# Patient Record
Sex: FEMALE | Race: WHITE | NOT HISPANIC OR LATINO
[De-identification: names, ages, dates, MRNs, and addresses within clinical notes are randomized per-mention and may not be internally consistent; named-entity substitution may affect disease eponyms.]

---

## 2019-05-14 PROBLEM — Z00.00 ENCOUNTER FOR PREVENTIVE HEALTH EXAMINATION: Status: ACTIVE | Noted: 2019-05-14

## 2019-05-21 ENCOUNTER — APPOINTMENT (OUTPATIENT)
Dept: DERMATOLOGY | Facility: CLINIC | Age: 74
End: 2019-05-21
Payer: MEDICARE

## 2019-05-21 VITALS — WEIGHT: 118 LBS | HEIGHT: 63 IN | BODY MASS INDEX: 20.91 KG/M2

## 2019-05-21 DIAGNOSIS — Z78.9 OTHER SPECIFIED HEALTH STATUS: ICD-10-CM

## 2019-05-21 DIAGNOSIS — D23.72 OTHER BENIGN NEOPLASM OF SKIN OF LEFT LOWER LIMB, INCLUDING HIP: ICD-10-CM

## 2019-05-21 DIAGNOSIS — Z80.8 FAMILY HISTORY OF MALIGNANT NEOPLASM OF OTHER ORGANS OR SYSTEMS: ICD-10-CM

## 2019-05-21 DIAGNOSIS — Z80.9 FAMILY HISTORY OF MALIGNANT NEOPLASM, UNSPECIFIED: ICD-10-CM

## 2019-05-21 DIAGNOSIS — Z87.2 PERSONAL HISTORY OF DISEASES OF THE SKIN AND SUBCUTANEOUS TISSUE: ICD-10-CM

## 2019-05-21 PROCEDURE — 99213 OFFICE O/P EST LOW 20 MIN: CPT

## 2019-05-21 NOTE — PHYSICAL EXAM
[Alert] : alert [Oriented x 3] : ~L oriented x 3 [Well Nourished] : well nourished [FreeTextEntry3] : The following areas were examined and no significant abnormalities were seen except as noted below:\par \par Type II skin\par \par scalp, face, eyelids, nose, lips, ears, neck, chest, abdomen, back, buttocks, right arm, left arm, right hand, left hand,\par right  leg, left leg, right foot, left foot\par Breast and groin exams offered and declined by patient.\par \par Nose: No lesions seen\par Left mid lateral back: 4 x 3 mm dark brown macule\par Upper mid back left: 2 well-healed linear scars present\par Left upper mid back: 7 x 5 mm well-demarcated light brown patch, darker west-no suspicious on dermoscopy\par Left flank: 3 mm, black, verrucous papule\par Upper abdomen: 6 x 4 mm, brown mildly mottled patch-not suspicious on dermoscopy\par Left upper shin: 4 x 3 mm, firm, dull, erythematous papule\par \par No suspicious lesions seen

## 2019-05-21 NOTE — HISTORY OF PRESENT ILLNESS
[FreeTextEntry1] : Evaluation of growths [de-identified] : Followup visit for a 73-year-old white female presenting for evaluation of growths. Particularly concerned about a lesion on the nose.? Previous history of skin cancer.

## 2020-02-10 ENCOUNTER — APPOINTMENT (OUTPATIENT)
Dept: DERMATOLOGY | Facility: CLINIC | Age: 75
End: 2020-02-10
Payer: MEDICARE

## 2020-02-10 PROCEDURE — 17000 DESTRUCT PREMALG LESION: CPT

## 2020-02-10 PROCEDURE — 17003 DESTRUCT PREMALG LES 2-14: CPT

## 2020-02-10 NOTE — HISTORY OF PRESENT ILLNESS
[FreeTextEntry1] : Growth on nose [de-identified] : Followup visit for 74-year-old white female presenting with a new lesion on the nose.\par ? Previous history of skin cancer.\par Note-patient had a full skin exam done on May 21, 2019.

## 2020-02-10 NOTE — PHYSICAL EXAM
[Alert] : alert [Oriented x 3] : ~L oriented x 3 [Well Nourished] : well nourished [FreeTextEntry3] : Distal nose: 3 x 3 mm pink scaly macule\par Left mid nose: 2 x 2 mm pink scaly macule

## 2020-05-06 ENCOUNTER — APPOINTMENT (OUTPATIENT)
Dept: DERMATOLOGY | Facility: CLINIC | Age: 75
End: 2020-05-06

## 2021-01-04 ENCOUNTER — TRANSCRIPTION ENCOUNTER (OUTPATIENT)
Age: 76
End: 2021-01-04

## 2021-03-25 ENCOUNTER — APPOINTMENT (OUTPATIENT)
Dept: DERMATOLOGY | Facility: CLINIC | Age: 76
End: 2021-03-25

## 2021-04-07 ENCOUNTER — APPOINTMENT (OUTPATIENT)
Dept: DERMATOLOGY | Facility: CLINIC | Age: 76
End: 2021-04-07
Payer: MEDICARE

## 2021-04-07 ENCOUNTER — LABORATORY RESULT (OUTPATIENT)
Age: 76
End: 2021-04-07

## 2021-04-07 ENCOUNTER — TRANSCRIPTION ENCOUNTER (OUTPATIENT)
Age: 76
End: 2021-04-07

## 2021-04-07 DIAGNOSIS — K13.0 DISEASES OF LIPS: ICD-10-CM

## 2021-04-07 PROCEDURE — 99213 OFFICE O/P EST LOW 20 MIN: CPT | Mod: 25

## 2021-04-07 PROCEDURE — 11102 TANGNTL BX SKIN SINGLE LES: CPT

## 2021-04-07 NOTE — HISTORY OF PRESENT ILLNESS
[FreeTextEntry1] : Evaluation of growths [de-identified] : Followup visit for 75-year-old white female last seen by me in February 10, 2020, presenting for evaluation of growths.  \par Emmy Vijay previous history of skin cancer.  Patient has history of actinic keratosis treated with cryosurgery in the past.\par Patient complains of recent sores on the lower lip following a trip to [place].  Attributes this to usage of a new type of toothpaste which she has since discontinued

## 2021-04-07 NOTE — ASSESSMENT
[FreeTextEntry1] : Cheilitis lower lip of uncertain etiology - ? Residua of herpes labialis,? Reaction to sunlight,? Reaction to new toothpaste\par ? Melanocytic nevus, R/O malignant melanoma on back

## 2021-04-07 NOTE — PHYSICAL EXAM
[Alert] : alert [Oriented x 3] : ~L oriented x 3 [Well Nourished] : well nourished [FreeTextEntry3] : The following areas were examined and no significant abnormalities were seen except as noted below:\par \par Type II skin\par \par scalp, face, eyelids, nose, lips, ears, neck, chest, abdomen, back, buttocks, right arm, left arm, right hand, left hand,\par right  leg, left leg, right foot, left foot\par Breast and groin exams offered and declined by patient.\par \par Lower lip:  Diffuse mild purulence present, especially mid and right lip areas\par Left upper midback: 7 x 5 mm splotchy light brown patch - near a jagged scar from prior procedure\par Rare small black for his papules on trunk\par \par No other suspicious lesions

## 2021-04-16 ENCOUNTER — NON-APPOINTMENT (OUTPATIENT)
Age: 76
End: 2021-04-16

## 2021-05-12 ENCOUNTER — APPOINTMENT (OUTPATIENT)
Dept: DERMATOLOGY | Facility: CLINIC | Age: 76
End: 2021-05-12
Payer: MEDICARE

## 2021-05-12 PROCEDURE — 99214 OFFICE O/P EST MOD 30 MIN: CPT

## 2021-06-07 ENCOUNTER — APPOINTMENT (OUTPATIENT)
Dept: DERMATOLOGY | Facility: CLINIC | Age: 76
End: 2021-06-07
Payer: MEDICARE

## 2021-06-07 ENCOUNTER — RESULT REVIEW (OUTPATIENT)
Age: 76
End: 2021-06-07

## 2021-06-07 ENCOUNTER — APPOINTMENT (OUTPATIENT)
Dept: DERMATOLOGY | Facility: CLINIC | Age: 76
End: 2021-06-07

## 2021-06-07 DIAGNOSIS — D22.9 MELANOCYTIC NEVI, UNSPECIFIED: ICD-10-CM

## 2021-06-07 PROCEDURE — 11402 EXC TR-EXT B9+MARG 1.1-2 CM: CPT

## 2021-06-07 RX ORDER — MUPIROCIN 20 MG/G
2 OINTMENT TOPICAL TWICE DAILY
Qty: 1 | Refills: 1 | Status: ACTIVE | COMMUNITY
Start: 2021-06-07 | End: 1900-01-01

## 2021-06-09 PROBLEM — D22.9 ATYPICAL NEVUS: Status: ACTIVE | Noted: 2021-05-16

## 2022-05-09 ENCOUNTER — APPOINTMENT (OUTPATIENT)
Dept: DERMATOLOGY | Facility: CLINIC | Age: 77
End: 2022-05-09
Payer: MEDICARE

## 2022-05-09 DIAGNOSIS — Z86.018 PERSONAL HISTORY OF OTHER BENIGN NEOPLASM: ICD-10-CM

## 2022-05-09 PROCEDURE — 11102 TANGNTL BX SKIN SINGLE LES: CPT

## 2022-05-09 PROCEDURE — 99212 OFFICE O/P EST SF 10 MIN: CPT | Mod: 25

## 2022-05-09 NOTE — ASSESSMENT
[FreeTextEntry1] : Seborrheic keratoses and melanocytic nevi on trunk\par ? Lichen planus like keratosis, rule out SCC in situ on right mid forearm

## 2022-05-09 NOTE — PHYSICAL EXAM
[Alert] : alert [Oriented x 3] : ~L oriented x 3 [Well Nourished] : well nourished [FreeTextEntry3] : The following areas were examined and no significant abnormalities were seen except as noted below:\par \par Type II skin\par \par scalp, face, eyelids, nose, lips, ears, neck, chest, abdomen, back, buttocks, right arm, left arm, right hand, left hand,\par right  leg, left leg, right foot, left foot\par Breast and groin exams offered and declined by patient.\par \par Right mid forearm: 8 x 6 mm bright erythematous scaly plaque\par Left upper back: No evidence recurrence of the previously removed lesion\par Left mid lateral back: 5 x 3 mm dark brown macule-not suspicious on dermoscopy\par Trunk: Rare small brown verrucous plaques\par \par No other suspicious lesions seen

## 2022-05-09 NOTE — HISTORY OF PRESENT ILLNESS
[FreeTextEntry1] : Evaluation of growths [de-identified] : Followup visit for 76-year-old white female last seen by me on April 7, 2021, for evaluation of growths.  Particularly concerned about a lesion on the right forearm present for 6 months\par Patient has a history of a moderately dysplastic nevus left upper back which was excised with no residual tumor on May 12, 2021.  \par ? History of prior skin cancers.

## 2022-05-24 ENCOUNTER — NON-APPOINTMENT (OUTPATIENT)
Age: 77
End: 2022-05-24

## 2022-06-22 ENCOUNTER — APPOINTMENT (OUTPATIENT)
Dept: DERMATOLOGY | Facility: CLINIC | Age: 77
End: 2022-06-22
Payer: MEDICARE

## 2022-06-22 DIAGNOSIS — D04.61 CARCINOMA IN SITU OF SKIN OF RIGHT UPPER LIMB, INCLUDING SHOULDER: ICD-10-CM

## 2022-06-22 PROCEDURE — 17261 DSTRJ MAL LES T/A/L .6-1.0CM: CPT

## 2022-06-22 NOTE — PHYSICAL EXAM
[Alert] : alert [Oriented x 3] : ~L oriented x 3 [Well Nourished] : well nourished [FreeTextEntry3] : Patient wearing a facemask\par \par Right forearm: Wound healing well without evidence of infection

## 2022-06-22 NOTE — HISTORY OF PRESENT ILLNESS
[FreeTextEntry1] : Squamous cell carcinoma in situ [de-identified] : Followup visit for a 76-year-old white female last seen by me on May 9, 2022, for eradication via D. & C. 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

## 2022-12-07 ENCOUNTER — APPOINTMENT (OUTPATIENT)
Dept: DERMATOLOGY | Facility: CLINIC | Age: 77
End: 2022-12-07

## 2022-12-07 PROCEDURE — 99212 OFFICE O/P EST SF 10 MIN: CPT | Mod: 25

## 2022-12-07 PROCEDURE — 11102 TANGNTL BX SKIN SINGLE LES: CPT

## 2022-12-07 NOTE — HISTORY OF PRESENT ILLNESS
[FreeTextEntry1] : Evaluation of growths [de-identified] : Follow-up visit for 76-year-old white female last seen by me on May 9, 2022, for evaluation of growths.  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  History of prior skin cancers.\par \par Particularly concerned about a lesion on the right arm which has become tender.  Also concerned about a dry area on the left lateral neck

## 2022-12-07 NOTE — ASSESSMENT
[FreeTextEntry1] : ?  Actinic keratosis,?  Residual of lichen planus-like keratosis on left lower lateral neck\par ?  Melanocytic nevus, inflamed on right distal arm

## 2022-12-07 NOTE — PHYSICAL EXAM
[Alert] : alert [Oriented x 3] : ~L oriented x 3 [Well Nourished] : well nourished [FreeTextEntry3] : The following areas were examined and no significant abnormalities were seen except as noted below:\par \par Type II skin\par \par scalp, face, eyelids, nose, lips, ears, neck, chest, abdomen, back, buttocks, right arm, left arm, right hand, left hand,\par right  leg, left leg, right foot, left foot\par Breast and groin exams offered and declined by patient.\par \par Left lower lateral neck: 8 x 4 mm desquamating pink patch–not suspicious on dermoscopy\par Right distal arm: 6 x 4 mm protuberant brown smooth plaque\par Right forearm: Wound well-healed without evidence of recurrence\par Trunk: Mild small black verrucous papules\par \par No other suspicious lesions seen\par \par No other suspicious lesions seen

## 2022-12-14 ENCOUNTER — NON-APPOINTMENT (OUTPATIENT)
Age: 77
End: 2022-12-14

## 2023-03-28 ENCOUNTER — APPOINTMENT (OUTPATIENT)
Dept: DERMATOLOGY | Facility: CLINIC | Age: 78
End: 2023-03-28
Payer: MEDICARE

## 2023-03-28 DIAGNOSIS — R20.8 OTHER DISTURBANCES OF SKIN SENSATION: ICD-10-CM

## 2023-03-28 DIAGNOSIS — L91.0 HYPERTROPHIC SCAR: ICD-10-CM

## 2023-03-28 DIAGNOSIS — D48.5 NEOPLASM OF UNCERTAIN BEHAVIOR OF SKIN: ICD-10-CM

## 2023-03-28 PROCEDURE — 11900 INJECT SKIN LESIONS </W 7: CPT

## 2023-03-28 PROCEDURE — 99212 OFFICE O/P EST SF 10 MIN: CPT | Mod: 25

## 2023-03-28 NOTE — ASSESSMENT
[FreeTextEntry1] : ?  Lichen planus-like keratosis,?  Actinic keratosis of left lower lateral neck\par Probable incipient hypertrophic scar on left zygoma from prior dog bite

## 2023-03-28 NOTE — PHYSICAL EXAM
[Alert] : alert [Oriented x 3] : ~L oriented x 3 [Well Nourished] : well nourished [FreeTextEntry3] : Left zygoma: 7 x 5 mm faint pink firm smooth nodule\par Left lower lateral neck: 10 x 8 mm desquamating erythematous patch

## 2023-03-28 NOTE — HISTORY OF PRESENT ILLNESS
[FreeTextEntry1] : Growth on left neck [de-identified] : Follow-up visit for 77-year-old white female last seen by me in December 7, 2022 (at which time she had a full skin exam) for a reevaluation of a lesion on the left lateral neck.\par Lesion described as an 8 x 4 mm desquamating pink patch.  Diagnosed as a ?  Actinic keratosis, ?  Residua of lichen planus-like keratosis\par Patient has history of squamous cell carcinoma in situ as well as other?  Skin cancers treated in the past.\par Patient also has history of moderately dysplastic nevus on the left upper back which was excised with no residual tumor on May 12, 2021\par \par Patient also complains of a new lump below the left eye which occurred after a dog bite

## 2023-06-27 ENCOUNTER — APPOINTMENT (OUTPATIENT)
Dept: DERMATOLOGY | Facility: CLINIC | Age: 78
End: 2023-06-27
Payer: MEDICARE

## 2023-06-27 ENCOUNTER — NON-APPOINTMENT (OUTPATIENT)
Age: 78
End: 2023-06-27

## 2023-06-27 DIAGNOSIS — L57.0 ACTINIC KERATOSIS: ICD-10-CM

## 2023-06-27 PROCEDURE — 99212 OFFICE O/P EST SF 10 MIN: CPT | Mod: 25

## 2023-06-27 PROCEDURE — 17000 DESTRUCT PREMALG LESION: CPT

## 2023-06-27 NOTE — PHYSICAL EXAM
[Alert] : alert [Oriented x 3] : ~L oriented x 3 [Well Nourished] : well nourished [FreeTextEntry3] : The following areas were examined and no significant abnormalities were seen except as noted below:\par \par Type II skin\par \par scalp, face, eyelids, nose, lips, ears, neck, chest, abdomen, back, buttocks, right arm, left arm, right hand, left hand,\par right  leg, left leg, right foot, left foot\par Breast and groin exams offered and declined by patient.\par \par Left lower lateral neck: 10 x 9 mm dull pink scaly patch\par (Lesion measured 10 x 8 mm on the visit of March 28, 2023)\par Back: Mild small pink papules\par Mild very small brown verrucous patches\par \par No other suspicious lesions seen\par

## 2023-06-27 NOTE — HISTORY OF PRESENT ILLNESS
[FreeTextEntry1] : Evaluation of growths [de-identified] : Follow-up visit for 77-year-old white female last seen by me on March 28, 2023, for evaluation of growths.\par Patient has history of squamous cell carcinoma in situ as well as other  ?  Skin cancers treated in the past.\par Patient also has history of moderately dysplastic nevus on the left upper back which was excised with no residual tumor in May, 2021.

## 2024-01-02 ENCOUNTER — APPOINTMENT (OUTPATIENT)
Dept: DERMATOLOGY | Facility: CLINIC | Age: 79
End: 2024-01-02

## 2024-02-07 ENCOUNTER — APPOINTMENT (OUTPATIENT)
Dept: DERMATOLOGY | Facility: CLINIC | Age: 79
End: 2024-02-07
Payer: MEDICARE

## 2024-02-07 DIAGNOSIS — D22.5 MELANOCYTIC NEVI OF TRUNK: ICD-10-CM

## 2024-02-07 DIAGNOSIS — L82.1 OTHER SEBORRHEIC KERATOSIS: ICD-10-CM

## 2024-02-07 DIAGNOSIS — Z86.007 PERSONAL HISTORY OF IN-SITU NEOPLASM OF SKIN: ICD-10-CM

## 2024-02-07 PROCEDURE — 99212 OFFICE O/P EST SF 10 MIN: CPT

## 2024-02-07 RX ORDER — METRONIDAZOLE 10 MG/G
1 GEL TOPICAL
Qty: 1 | Refills: 2 | Status: ACTIVE | COMMUNITY
Start: 2019-05-21 | End: 1900-01-01

## 2024-02-07 NOTE — HISTORY OF PRESENT ILLNESS
[FreeTextEntry1] : Evaluation of growths [de-identified] : Follow-up visit for 78-year-old white female last seen by me on June 27, 2023, for evaluation of growths.  Patient has history of squamous cell carcinoma in situ as well as other ? Skin cancers treated in the past.  Patient also has history of moderately dysplastic nevus on the left upper back which was excised with no residual tumor in May, 2021.  Patient also has history of actinic keratoses treated with cryosurgery

## 2024-02-07 NOTE — PLAN
[TextEntry] : Reassurance about all skin growths  Return in 6 months  CMS time of visit: 13-minutes  [employer] student, served as chaperone and was present for the entire skin exam.

## 2024-02-07 NOTE — PHYSICAL EXAM
[Alert] : alert [Oriented x 3] : ~L oriented x 3 [Well Nourished] : well nourished [FreeTextEntry3] : The following areas were examined and no significant abnormalities were seen except as noted below:  Type II skin  scalp, face, eyelids, nose, lips, ears, neck, chest, abdomen, back, buttocks, right arm, left arm, right hand, left hand, right  leg, left leg, right foot, left foot Breast and groin exams offered and declined by patient.  Back: Mild small thin tan verrucous papules Left mid lateral back: 6 x 3 mm dark brown patch, lighter periphery-not suspicious on dermoscopy Lesion measured 5 x 3 mm on the visit of May 9, 2022  No suspicious lesions seen

## 2024-08-07 ENCOUNTER — APPOINTMENT (OUTPATIENT)
Dept: DERMATOLOGY | Facility: CLINIC | Age: 79
End: 2024-08-07

## 2024-09-24 ENCOUNTER — APPOINTMENT (OUTPATIENT)
Dept: DERMATOLOGY | Facility: CLINIC | Age: 79
End: 2024-09-24
Payer: MEDICARE

## 2024-09-24 DIAGNOSIS — L82.1 OTHER SEBORRHEIC KERATOSIS: ICD-10-CM

## 2024-09-24 DIAGNOSIS — D22.5 MELANOCYTIC NEVI OF TRUNK: ICD-10-CM

## 2024-09-24 DIAGNOSIS — Z86.007 PERSONAL HISTORY OF IN-SITU NEOPLASM OF SKIN: ICD-10-CM

## 2024-09-24 PROCEDURE — 99212 OFFICE O/P EST SF 10 MIN: CPT

## 2024-09-24 NOTE — ASSESSMENT
[FreeTextEntry1] : Melanocytic nevus, probably mildly dysplastic on left mid lateral back Seborrheic keratoses on back

## 2024-09-24 NOTE — PLAN
[TextEntry] : Will continue to observe the dysplastic nevus  Return in 6 months   CMS time of visit: 9 minutes

## 2024-09-24 NOTE — PHYSICAL EXAM
[Alert] : alert [Oriented x 3] : ~L oriented x 3 [Well Nourished] : well nourished [FreeTextEntry3] : The following areas were examined and no significant abnormalities were seen except as noted below:  Type II skin  scalp, face, eyelids, nose, lips, ears, neck, chest, abdomen, back, buttocks, right arm, left arm, right hand, left hand, right  leg, left leg, right foot, left foot Breast and groin exams offered and declined by patient.  Left mid lateral back: 5 x 3.5 mm dark brown macule-lighter at periphery-not suspicious on dermoscopy (Lesion measured 6 x 3 mm on the visit of February 7, 2024 and 5 x 3 mm on the visit of May 9, 2022) Back: Mild thin brown verrucous plaques

## 2024-09-24 NOTE — HISTORY OF PRESENT ILLNESS
[FreeTextEntry1] : Evaluation of growths [de-identified] : Follow-up visit with 78-year-old white female last seen by me on February 7, 2024, for evaluation of growths.  Patient has history of squamous cell carcinoma in situ as well as other ? Skin cancers treated in the past.  Patient also has history of moderately dysplastic nevus on the left upper back which was excised with no residual tumor in May, 2021.  Patient also has history of actinic keratoses treated with cryosurgery

## 2025-03-11 ENCOUNTER — APPOINTMENT (OUTPATIENT)
Dept: DERMATOLOGY | Facility: CLINIC | Age: 80
End: 2025-03-11
Payer: MEDICARE

## 2025-03-11 DIAGNOSIS — D22.5 MELANOCYTIC NEVI OF TRUNK: ICD-10-CM

## 2025-03-11 DIAGNOSIS — Z86.007 PERSONAL HISTORY OF IN-SITU NEOPLASM OF SKIN: ICD-10-CM

## 2025-03-11 DIAGNOSIS — L82.1 OTHER SEBORRHEIC KERATOSIS: ICD-10-CM

## 2025-03-11 PROCEDURE — 99212 OFFICE O/P EST SF 10 MIN: CPT
